# Patient Record
Sex: FEMALE | Race: AMERICAN INDIAN OR ALASKA NATIVE | HISPANIC OR LATINO | Employment: FULL TIME | URBAN - METROPOLITAN AREA
[De-identification: names, ages, dates, MRNs, and addresses within clinical notes are randomized per-mention and may not be internally consistent; named-entity substitution may affect disease eponyms.]

---

## 2019-08-12 ENCOUNTER — APPOINTMENT (OUTPATIENT)
Dept: PHYSICAL THERAPY | Facility: MEDICAL CENTER | Age: 39
End: 2019-08-12
Attending: PHYSICIAN ASSISTANT
Payer: COMMERCIAL

## 2023-04-19 ENCOUNTER — OFFICE VISIT (OUTPATIENT)
Dept: NEPHROLOGY | Facility: MEDICAL CENTER | Age: 43
End: 2023-04-19

## 2023-04-19 VITALS
WEIGHT: 219 LBS | OXYGEN SATURATION: 98 % | TEMPERATURE: 98.6 F | HEIGHT: 64 IN | DIASTOLIC BLOOD PRESSURE: 84 MMHG | SYSTOLIC BLOOD PRESSURE: 142 MMHG | HEART RATE: 71 BPM | BODY MASS INDEX: 37.39 KG/M2

## 2023-04-19 DIAGNOSIS — N18.31 STAGE 3A CHRONIC KIDNEY DISEASE: ICD-10-CM

## 2023-04-19 PROCEDURE — 99214 OFFICE O/P EST MOD 30 MIN: CPT | Performed by: INTERNAL MEDICINE

## 2023-04-19 RX ORDER — ATORVASTATIN CALCIUM 10 MG/1
10 TABLET, FILM COATED ORAL
COMMUNITY
Start: 2023-03-21 | End: 2024-02-29

## 2023-04-19 RX ORDER — LISINOPRIL 10 MG/1
1 TABLET ORAL DAILY
COMMUNITY
Start: 2023-03-20 | End: 2024-02-29

## 2023-04-19 RX ORDER — INSULIN GLARGINE 100 [IU]/ML
40 INJECTION, SOLUTION SUBCUTANEOUS EVERY EVENING
COMMUNITY
Start: 2023-03-21

## 2023-04-19 RX ORDER — SEMAGLUTIDE 1.34 MG/ML
1 INJECTION, SOLUTION SUBCUTANEOUS DAILY
COMMUNITY
Start: 2023-03-20 | End: 2024-03-18

## 2023-04-19 RX ORDER — INSULIN LISPRO 100 [IU]/ML
INJECTION, SOLUTION INTRAVENOUS; SUBCUTANEOUS
COMMUNITY
Start: 2023-03-21

## 2023-04-19 NOTE — PROGRESS NOTES
"NEPHROLOGY HISTORY AND PHYSICAL CLINIC        HPI:  Laine Amador is a 42 y.o. female who presents today to establish care.   Patient presents as a referal from [facility].   Cr 1.5 with miya ssociated eGFR 37 ml/min/1.73 m2.  Nephrology referral placed for further evaluation of suspected chronic kidney disease.    She has been in her normal state of health.    She does have longstanding insulin treated diabetes as well as complication with retinopathy at this time.  She was first diagnosed at the age of 20 years of age and has been on insulin for nearly 20 years.    Note she is on Ozempic for approximately 1 year.    She does report occasional nausea associated with insulin use however denies any vomiting or loose stools.  She denies chest pain and shortness of breath.  She denies urinary complaints at this time however does endorse seeing bubbles in her urine..  She does not take NSAIDs.    PMH:    Diabetes complicated by retinopathy in Insulin - diagnosed at age 20 years. Had been on metformin briefly and then started insulin around 2008  HTN  HLD    Social History  Smoked for 1 month around the age of 21 years.  Denies alcohol and illicit drug use   [employer]  Interest also in photojournalism  Has a 23 year old daughter    Family History - father suspected to have kidney disease  Daughter type 1     ROS    BP (!) 142/84 (BP Location: Right arm, Patient Position: Sitting, BP Cuff Size: Adult)   Pulse 71   Temp 37 °C (98.6 °F) (Temporal)   Ht 1.626 m (5' 4\")   Wt 99.3 kg (219 lb)   SpO2 98%   BMI 37.59 kg/m²     Physical Exam  GEN: alert and oriented. In no acute distress.   HEENT: moist oropharyngeal mucous membranes  CV:RRR  PULM: clear to auscultation bilaterally  ABD: soft non tender non distended  EXT: warm well perfused, no lower extremity edema.    Labs reviewed.  reviewed     Assessment:  Laine Amador is a 42 y.o. female who presents today to establish " care.  Patient with renal dysfunction likely chronic kidney disease.  Recent EGFR noted to be 37 L/min per 1.73 m²    Likely due to diabetic nephropathy. Patient with other microvascular changes mainly diabetic retinopathy.  Will obtain urinalysis and quantify protenuria.  Routine management of hypertension will also be essential in this patient.    - Routine management of hypertension and diabetes.  - Obtain repeat labs including repeat urinalysis.  -Obtain protein quantification.     - Continue lisinopril 10 gm for longterm nephroprotection as well as hypertension  -Dose all medications for patient eGFR  - avoid nephrotoxic agents including NSAIDs and contrast  -Encourage weight loss with caloric restriction and exercise.  -Defer renal ultrasound at this time unless urinary tract symptoms or with renal function decline  -Encourage low-sodium diet.  - Return to clinic in 4 months.        Simi Lopez MD  Nephrology  [facility]

## 2023-08-18 ENCOUNTER — APPOINTMENT (OUTPATIENT)
Dept: NEPHROLOGY | Facility: MEDICAL CENTER | Age: 43
End: 2023-08-18
Payer: COMMERCIAL

## 2023-08-23 ENCOUNTER — APPOINTMENT (OUTPATIENT)
Dept: NEPHROLOGY | Facility: MEDICAL CENTER | Age: 43
End: 2023-08-23
Payer: COMMERCIAL

## 2024-01-12 ENCOUNTER — TELEMEDICINE (OUTPATIENT)
Dept: NEPHROLOGY | Facility: MEDICAL CENTER | Age: 44
End: 2024-01-12
Payer: COMMERCIAL

## 2024-01-12 VITALS — HEIGHT: 64 IN | WEIGHT: 216 LBS | BODY MASS INDEX: 36.88 KG/M2

## 2024-01-12 DIAGNOSIS — N18.31 STAGE 3A CHRONIC KIDNEY DISEASE: ICD-10-CM

## 2024-01-12 PROCEDURE — 99214 OFFICE O/P EST MOD 30 MIN: CPT | Mod: 95 | Performed by: INTERNAL MEDICINE

## 2024-01-12 RX ORDER — SODIUM BICARBONATE 650 MG/1
650 TABLET ORAL 2 TIMES DAILY
Qty: 30 TABLET | Refills: 3 | Status: SHIPPED | OUTPATIENT
Start: 2024-01-12

## 2024-01-12 NOTE — PROGRESS NOTES
"NEPHROLOGY HISTORY AND PHYSICAL CONSULT NOTE    Chief Complaint   Patient presents with    Chronic Kidney Disease     This evaluation was conducted via Zoom using secure and encrypted videoconferencing technology. The patient was in a private location outside of their home in the [facility].    The patient's identity was confirmed and verbal consent was obtained for this virtual visit.       HPI:  Laine Amador is a 43 y.o. female with DM who presents for evaluation of ckd.     She has been having ocular injections for hemorrhage.     She remains on lisinopril 20 mg.      Most recent Cr 2.01 with an associated eGFR of 31 mL/min/1.73 m2.  HCOe 16. Hgb 13.4. Albumin to Cr 3.7g.     She was briefly on jardiance however did not tolerate it with nausea, vomitting. Therfore this was discontinued.    Patient remains on insulin regimen of lantus 50 units qAM and 50 units qHS, She takes humalog as needed with meals.     She does report occasional hypoglycemia  She is working with nutritionist.   No past medical history on file.    Outpatient Encounter Medications as of 1/12/2024   Medication Sig Dispense Refill    atorvastatin (LIPITOR) 10 MG Tab Take 10 mg by mouth.      LANTUS SOLOSTAR 100 UNIT/ML Solution Pen-injector injection       HUMALOG KWIKPEN 100 UNIT/ML Solution Pen-injector injection PEN       lisinopril (PRINIVIL) 10 MG Tab Take 1 Tablet by mouth every day.      Semaglutide, 1 MG/DOSE, (OZEMPIC, 1 MG/DOSE,) 4 MG/3ML Solution Pen-injector Inject  under the skin.       No facility-administered encounter medications on file as of 1/12/2024.        Allergies   Allergen Reactions    Metformin Nausea and Vomiting       ROS    Ht 1.626 m (5' 4\")   Wt 98 kg (216 lb)   BMI 37.08 kg/m²     Physical Exam  GEN: alert and oriented. In no acute distress.   HEENT: moist oropharyngeal mucous membranes  CV:RRR  PULM: clear to auscultation bilaterally  ABD: soft non tender non distended  EXT: warm well perfused, no lower " "extremity edema.    Labs reviewed.  No results for input(s): \"HGB\", \"ALBUMIN\", \"HDL\", \"TRIGLYCERIDE\", \"SODIUM\", \"POTASSIUM\", \"CHLORIDE\", \"CO2\", \"BUN\", \"CREATININE\", \"PHOSPHORUS\" in the last 8544 hours.    Invalid input(s): \"CHOLESTEROL\", \"LDL CLACULATED\", \"URIC ACID\", \"INTACT PTH\", \"PRO CREAT RATIO\"    No results found for: \"WBC\", \"RBC\", \"HEMOGLOBIN\", \"HEMATOCRIT\", \"MCV\", \"MCH\", \"MCHC\", \"MPV\"           URINALYSIS:  No results found for: \"COLORURINE\", \"CLARITY\", \"SPECGRAVITY\", \"PHURINE\", \"KETONES\", \"PROTEINURIN\", \"BILIRUBINUR\", \"UROBILU\", \"NITRITE\", \"LEUKESTERAS\", \"OCCULTBLOOD\"  UPC  No results found for: \"TOTPROTUR\" No results found for: \"CREATININEU\"    Imaging report(s) reviewed  No orders to display         Assessment:  Laine Amador is a 42 y.o. female who presents today to establish care.  Patient with renal dysfunction likely chronic kidney disease.  Recent EGFR noted to be 37 L/min per 1.73 m²     Likely due to diabetic nephropathy. Patient with other microvascular changes mainly diabetic retinopathy.  Will obtain urinalysis and quantify protenuria.  Routine management of hypertension will also be essential in this patient.     - Routine management of hypertension and diabetes.  - Obtain repeat labs including repeat urinalysis.  -Obtain protein quantification.     - Continue lisinopril 20 gm for longterm nephroprotection.   -Dose all medications for patient eGFR  - avoid nephrotoxic agents including NSAIDs and contrast  -Encourage weight loss with caloric restriction and exercise.  -Recommend renal ultrasound.   - Recommend renal biopsy  -Encourage low-sodium diet.  - Return to clinic in 4 months.         2. Diabetes Melitus- Continue lantus 50 qAM and 50 units of qHS. Continue to work with nutritionist. Diabetic diet.     3. Non Gap Metabolic Acidosis - moderately low. May be related to CKD. Sodium bicarbonate 650 po BID for now.     4. HTN - unclear what home blood pressures     5.Overweight- Ozempic. " Encourage weight loss with exercise and caloric  restriction.     Return to clinic in 1  months.     Simi Lopez MD  Nephrology  [facility]

## 2024-01-31 ENCOUNTER — HOSPITAL ENCOUNTER (OUTPATIENT)
Dept: RADIOLOGY | Facility: MEDICAL CENTER | Age: 44
End: 2024-01-31
Attending: INTERNAL MEDICINE
Payer: COMMERCIAL

## 2024-01-31 DIAGNOSIS — N18.31 STAGE 3A CHRONIC KIDNEY DISEASE: ICD-10-CM

## 2024-01-31 PROCEDURE — 76775 US EXAM ABDO BACK WALL LIM: CPT

## 2024-02-02 ENCOUNTER — APPOINTMENT (OUTPATIENT)
Dept: ADMISSIONS | Facility: MEDICAL CENTER | Age: 44
End: 2024-02-02
Attending: INTERNAL MEDICINE
Payer: COMMERCIAL

## 2024-02-16 ENCOUNTER — PATIENT MESSAGE (OUTPATIENT)
Dept: HEALTH INFORMATION MANAGEMENT | Facility: OTHER | Age: 44
End: 2024-02-16

## 2024-02-29 ENCOUNTER — APPOINTMENT (OUTPATIENT)
Dept: ADMISSIONS | Facility: MEDICAL CENTER | Age: 44
End: 2024-02-29
Payer: COMMERCIAL

## 2024-02-29 RX ORDER — INSULIN GLARGINE 100 [IU]/ML
30 INJECTION, SOLUTION SUBCUTANEOUS EVERY EVENING
COMMUNITY

## 2024-02-29 RX ORDER — LISINOPRIL 20 MG/1
20 TABLET ORAL DAILY
COMMUNITY

## 2024-02-29 RX ORDER — ATORVASTATIN CALCIUM 10 MG/1
10 TABLET, FILM COATED ORAL DAILY
COMMUNITY

## 2024-03-01 ENCOUNTER — APPOINTMENT (OUTPATIENT)
Dept: RADIOLOGY | Facility: MEDICAL CENTER | Age: 44
End: 2024-03-01
Attending: INTERNAL MEDICINE
Payer: COMMERCIAL

## 2024-03-01 ENCOUNTER — HOSPITAL ENCOUNTER (OUTPATIENT)
Facility: MEDICAL CENTER | Age: 44
End: 2024-03-01
Attending: INTERNAL MEDICINE | Admitting: INTERNAL MEDICINE
Payer: COMMERCIAL

## 2024-03-01 VITALS
WEIGHT: 216.27 LBS | HEIGHT: 64 IN | OXYGEN SATURATION: 99 % | SYSTOLIC BLOOD PRESSURE: 149 MMHG | DIASTOLIC BLOOD PRESSURE: 70 MMHG | RESPIRATION RATE: 18 BRPM | TEMPERATURE: 97 F | HEART RATE: 96 BPM | BODY MASS INDEX: 36.92 KG/M2

## 2024-03-01 DIAGNOSIS — N18.31 STAGE 3A CHRONIC KIDNEY DISEASE: ICD-10-CM

## 2024-03-01 LAB
ERYTHROCYTE [DISTWIDTH] IN BLOOD BY AUTOMATED COUNT: 40.3 FL (ref 35.9–50)
GLUCOSE BLD STRIP.AUTO-MCNC: 133 MG/DL (ref 65–99)
GLUCOSE BLD STRIP.AUTO-MCNC: 56 MG/DL (ref 65–99)
GLUCOSE BLD STRIP.AUTO-MCNC: 58 MG/DL (ref 65–99)
GLUCOSE BLD STRIP.AUTO-MCNC: 64 MG/DL (ref 65–99)
GLUCOSE BLD STRIP.AUTO-MCNC: 80 MG/DL (ref 65–99)
HCG UR QL: NEGATIVE
HCT VFR BLD AUTO: 42.6 % (ref 37–47)
HGB BLD-MCNC: 14.3 G/DL (ref 12–16)
INR PPP: 1 (ref 0.87–1.13)
MCH RBC QN AUTO: 28.7 PG (ref 27–33)
MCHC RBC AUTO-ENTMCNC: 33.6 G/DL (ref 32.2–35.5)
MCV RBC AUTO: 85.5 FL (ref 81.4–97.8)
PATHOLOGY CONSULT NOTE: NORMAL
PLATELET # BLD AUTO: 303 K/UL (ref 164–446)
PMV BLD AUTO: 10.8 FL (ref 9–12.9)
PROTHROMBIN TIME: 13.3 SEC (ref 12–14.6)
RBC # BLD AUTO: 4.98 M/UL (ref 4.2–5.4)
WBC # BLD AUTO: 10.8 K/UL (ref 4.8–10.8)

## 2024-03-01 PROCEDURE — 700105 HCHG RX REV CODE 258: Performed by: RADIOLOGY

## 2024-03-01 PROCEDURE — 85610 PROTHROMBIN TIME: CPT

## 2024-03-01 PROCEDURE — 81025 URINE PREGNANCY TEST: CPT

## 2024-03-01 PROCEDURE — 82962 GLUCOSE BLOOD TEST: CPT

## 2024-03-01 PROCEDURE — 160002 HCHG RECOVERY MINUTES (STAT)

## 2024-03-01 PROCEDURE — 77012 CT SCAN FOR NEEDLE BIOPSY: CPT

## 2024-03-01 PROCEDURE — 160035 HCHG PACU - 1ST 60 MINS PHASE I

## 2024-03-01 PROCEDURE — 88300 SURGICAL PATH GROSS: CPT

## 2024-03-01 PROCEDURE — 700111 HCHG RX REV CODE 636 W/ 250 OVERRIDE (IP): Mod: JZ | Performed by: STUDENT IN AN ORGANIZED HEALTH CARE EDUCATION/TRAINING PROGRAM

## 2024-03-01 PROCEDURE — 700111 HCHG RX REV CODE 636 W/ 250 OVERRIDE (IP): Mod: JZ

## 2024-03-01 PROCEDURE — 85027 COMPLETE CBC AUTOMATED: CPT

## 2024-03-01 RX ORDER — HYDRALAZINE HYDROCHLORIDE 20 MG/ML
INJECTION INTRAMUSCULAR; INTRAVENOUS
Status: COMPLETED
Start: 2024-03-01 | End: 2024-03-01

## 2024-03-01 RX ORDER — MIDAZOLAM HYDROCHLORIDE 1 MG/ML
INJECTION INTRAMUSCULAR; INTRAVENOUS
Status: COMPLETED
Start: 2024-03-01 | End: 2024-03-01

## 2024-03-01 RX ORDER — MIDAZOLAM HYDROCHLORIDE 1 MG/ML
.5-2 INJECTION INTRAMUSCULAR; INTRAVENOUS PRN
Status: DISCONTINUED | OUTPATIENT
Start: 2024-03-01 | End: 2024-03-01 | Stop reason: HOSPADM

## 2024-03-01 RX ORDER — HYDRALAZINE HYDROCHLORIDE 20 MG/ML
10 INJECTION INTRAMUSCULAR; INTRAVENOUS ONCE
Status: COMPLETED | OUTPATIENT
Start: 2024-03-01 | End: 2024-03-01

## 2024-03-01 RX ORDER — SODIUM CHLORIDE 9 MG/ML
1000 INJECTION, SOLUTION INTRAVENOUS
Status: COMPLETED | OUTPATIENT
Start: 2024-03-01 | End: 2024-03-01

## 2024-03-01 RX ORDER — ONDANSETRON 2 MG/ML
4 INJECTION INTRAMUSCULAR; INTRAVENOUS PRN
Status: DISCONTINUED | OUTPATIENT
Start: 2024-03-01 | End: 2024-03-01 | Stop reason: HOSPADM

## 2024-03-01 RX ORDER — SODIUM CHLORIDE 9 MG/ML
500 INJECTION, SOLUTION INTRAVENOUS
Status: DISCONTINUED | OUTPATIENT
Start: 2024-03-01 | End: 2024-03-01 | Stop reason: HOSPADM

## 2024-03-01 RX ADMIN — HYDRALAZINE HYDROCHLORIDE 10 MG: 20 INJECTION INTRAMUSCULAR; INTRAVENOUS at 14:10

## 2024-03-01 RX ADMIN — MIDAZOLAM HYDROCHLORIDE 2 MG: 1 INJECTION INTRAMUSCULAR; INTRAVENOUS at 14:27

## 2024-03-01 RX ADMIN — HYDRALAZINE HYDROCHLORIDE 10 MG: 20 INJECTION, SOLUTION INTRAMUSCULAR; INTRAVENOUS at 14:10

## 2024-03-01 RX ADMIN — SODIUM CHLORIDE 1000 ML: 9 INJECTION, SOLUTION INTRAVENOUS at 11:18

## 2024-03-01 RX ADMIN — MIDAZOLAM HYDROCHLORIDE 2 MG: 1 INJECTION, SOLUTION INTRAMUSCULAR; INTRAVENOUS at 14:27

## 2024-03-01 RX ADMIN — FENTANYL CITRATE 50 MCG: 50 INJECTION, SOLUTION INTRAMUSCULAR; INTRAVENOUS at 14:27

## 2024-03-01 RX ADMIN — FENTANYL CITRATE 50 MCG: 50 INJECTION, SOLUTION INTRAMUSCULAR; INTRAVENOUS at 14:31

## 2024-03-01 NOTE — DISCHARGE INSTRUCTIONS
HOME CARE INSTRUCTIONS    ACTIVITY: Rest and take it easy for the first 24 hours.  A responsible adult is recommended to remain with you during that time.  It is normal to feel sleepy.  We encourage you to not do anything that requires balance, judgment or coordination.    FOR 24 HOURS DO NOT:  Drive, operate machinery or run household appliances.  Drink beer or alcoholic beverages.  Make important decisions or sign legal documents.    SPECIAL INSTRUCTIONS:     Percutaneous Kidney Biopsy, Care After  This sheet gives you information about how to care for yourself after your procedure. Your health care provider may also give you more specific instructions. If you have problems or questions, contact your health care provider.  What can I expect after the procedure?  After the procedure, it is common to have:  Pain or soreness near the biopsy site.  Pink or cloudy urine for 24 hours after the procedure. This is normal.  Follow these instructions at home:  Activity  Return to your normal activities as told by your health care provider. Ask your health care provider what activities are safe for you.  If you were given a sedative during the procedure, it can affect you for several hours. Do not drive or operate machinery until your health care provider says that it is safe.  Do not lift anything that is heavier than 10 lb (4.5 kg), or the limit that you are told, until your health care provider says that it is safe.  Avoid activities that take a lot of effort until your health care provider approves. Most people will have to wait 2 weeks before returning to activities such as exercise or sex.  General instructions  Take over-the-counter and prescription medicines only as told by your health care provider.  Follow instructions from your health care provider about eating or drinking restrictions.  Check your biopsy site every day for signs of infection. Check for:  More redness, swelling, or pain.  Fluid or  blood.  Warmth.  Pus or a bad smell.  Keep all follow-up visits as told by your health care provider. This is important.  Contact a health care provider if:  You have more redness, swelling, or pain around your biopsy site.  You have fluid or blood coming from your biopsy site.  Your biopsy site feels warm to the touch.  You have pus or a bad smell coming from your biopsy site.  You have blood in your urine more than 24 hours after your procedure.  Get help right away if:  Your urine is dark red or brown.  You have a fever.  You are not able to urinate.  You feel burning when you urinate.  You feel dizzy or light-headed.  You have severe pain in your abdomen or side.  Summary  After the procedure, it is common to have pain or soreness at the biopsy site and pink or cloudy urine for the first 24 hours.  Check your biopsy site each day for signs of infection, such as more redness, swelling, or pain; fluid, blood, pus or a bad smell coming from the biopsy site; or the biopsy site feeling warm to the touch.  Return to your normal activities as told by your health care provider.  This information is not intended to replace advice given to you by your health care provider. Make sure you discuss any questions you have with your health care provider.    DIET: To avoid nausea, slowly advance diet as tolerated, avoiding spicy or greasy foods for the first day.  Add more substantial food to your diet according to your physician's instructions.  Babies can be fed formula or breast milk as soon as they are hungry.  INCREASE FLUIDS AND FIBER TO AVOID CONSTIPATION.    SURGICAL DRESSING/BATHING: keep dressing clean and dry for 24 hours. Okay to remove and shower after that. Do not submerge in water for one week.    MEDICATIONS: Resume taking daily medication.  Take prescribed pain medication with food.  If no medication is prescribed, you may take non-aspirin pain medication if needed.  PAIN MEDICATION CAN BE VERY CONSTIPATING.   Take a stool softener or laxative such as senokot, pericolace, or milk of magnesia if needed.    A follow-up appointment should be arranged with your doctor in 1-2 weeks; call to schedule.    You should CALL YOUR PHYSICIAN if you develop:  Fever greater than 101 degrees F.  Pain not relieved by medication, or persistent nausea or vomiting.  Excessive bleeding (blood soaking through dressing) or unexpected drainage from the wound.  Extreme redness or swelling around the incision site, drainage of pus or foul smelling drainage.  Inability to urinate or empty your bladder within 8 hours.  Problems with breathing or chest pain.    You should call 911 if you develop problems with breathing or chest pain.  If you are unable to contact your doctor or surgical center, you should go to the nearest emergency room or urgent care center.  Physician's telephone #: Dr. Lopez [phone].    MILD FLU-LIKE SYMPTOMS ARE NORMAL.  YOU MAY EXPERIENCE GENERALIZED MUSCLE ACHES, THROAT IRRITATION, HEADACHE AND/OR SOME NAUSEA.    If any questions arise, call your doctor.  If your doctor is not available, please feel free to call the Surgical Center at [phone].  The Center is open Monday through Friday from 7AM to 7PM.      A registered nurse may call you a few days after your surgery to see how you are doing after your procedure.    You may also receive a survey in the mail within the next two weeks and we ask that you take a few moments to complete the survey and return it to us.  Our goal is to provide you with very good care and we value your comments.     Depression / Suicide Risk    As you are discharged from this [facility] facility, it is important to learn how to keep safe from harming yourself.    Recognize the warning signs:  Abrupt changes in personality, positive or negative- including increase in energy   Giving away possessions  Change in eating patterns- significant weight changes-  positive or  negative  Change in sleeping patterns- unable to sleep or sleeping all the time   Unwillingness or inability to communicate  Depression  Unusual sadness, discouragement and loneliness  Talk of wanting to die  Neglect of personal appearance   Rebelliousness- reckless behavior  Withdrawal from people/activities they love  Confusion- inability to concentrate     If you or a loved one observes any of these behaviors or has concerns about self-harm, here's what you can do:  Talk about it- your feelings and reasons for harming yourself  Remove any means that you might use to hurt yourself (examples: pills, rope, extension cords, firearm)  Get professional help from the community (Mental Health, Substance Abuse, psychological counseling)  Do not be alone:Call your Safe Contact- someone whom you trust who will be there for you.  Call your local CRISIS HOTLINE 077-2057 or 350-680-4092  Call your local Children's Mobile Crisis Response Team [place] (443) 197-4624 or www.[employer]  Call the toll free National Suicide Prevention Hotlines   National Suicide Prevention Lifeline 567-342-CQTI (2647)  [place] [facility] 800-SUICIDE (344-5629)    I acknowledge receipt and understanding of these Home Care instructions.

## 2024-03-01 NOTE — H&P
History and Physical    Date: 3/1/2024    PCP: Leilani Turcios M.D.      CC: Renal dysfunction    HPI: This is a 43 y.o. female who is presenting with renal dysfunction    Past Medical History:   Diagnosis Date    Breath shortness     with exercise, feels like throat closes on me    Diabetes (HCC)     diet and insulin    Hypertension     Renal disorder     decreased kidney function - stage 3       History reviewed. No pertinent surgical history.    No current facility-administered medications for this encounter.        Social History     Socioeconomic History    Marital status: Single     Spouse name: Not on file    Number of children: Not on file    Years of education: Not on file    Highest education level: Not on file   Occupational History    Not on file   Tobacco Use    Smoking status: Former     Current packs/day: 0.00     Types: Cigarettes     Quit date: 2008     Years since quittin.1    Smokeless tobacco: Never   Vaping Use    Vaping Use: Never used   Substance and Sexual Activity    Alcohol use: Not Currently     Comment: rare, doesn't tolerate well    Drug use: Never    Sexual activity: Not on file   Other Topics Concern    Not on file   Social History Narrative    Not on file     Social Determinants of Health     Financial Resource Strain: Not on file   Food Insecurity: Not on file   Transportation Needs: Not on file   Physical Activity: Not on file   Stress: Not on file   Social Connections: Not on file   Intimate Partner Violence: Not on file   Housing Stability: Not on file       History reviewed. No pertinent family history.    Allergies:  Metformin    Review of Systems:  Negative except for none    Physical Exam  Pulmonary:      Effort: Pulmonary effort is normal.   Skin:     General: Skin is warm and dry.   Neurological:      Mental Status: She is alert.   Psychiatric:         Mood and Affect: Mood normal.         Behavior: Behavior normal.         Thought Content: Thought content  normal.         Judgment: Judgment normal.         Vital Signs  Blood Pressure: (!) 152/103   Temperature: 36.3 °C (97.3 °F)   Pulse: 92   Respiration: 20   Pulse Oximetry: 94 %        Labs:  Recent Labs     03/01/24  1105   WBC 10.8   RBC 4.98   HEMOGLOBIN 14.3   HEMATOCRIT 42.6   MCV 85.5   MCH 28.7   MCHC 33.6   RDW 40.3   PLATELETCT 303   MPV 10.8         Recent Labs     03/01/24  1105   INR 1.00     Recent Labs     03/01/24  1105   INR 1.00       Radiology:  No orders to display             Assessment and Plan:This is a 43 y.o. with renal dysfunction for biopsy

## 2024-03-01 NOTE — PROGRESS NOTES
Pt presents to CT 4. Pt. was consented by MD at bedside, confirmed by this RN and consent at bedside. Pt transferred to procedure table in supine position. Patient underwent a non-targeted renal biopsy by Dr. Nathan. Procedure site was marked by MD and verified using imaging guidance. Pt placed on monitor, prepped and draped in a sterile fashion. Vitals were taken every 5 minutes and remained stable during procedure (see doc flow sheet for results). CO2 waveform capnography was monitored and remained WNL throughout procedure. Report called to KIRAN Lehman. Pt transported by stretcher with RN to PPU 2.     Specimen: Taken by pathology from Dr. Nathan

## 2024-03-01 NOTE — OR SURGEON
Immediate Post- Operative Note        Findings: Left kidney      Procedure(s): Biopsy      Estimated Blood Loss: Less than 5 ml        Complications: None            3/1/2024     2:40 PM     Reji Nathan M.D.

## 2024-03-02 NOTE — OR NURSING
1445 Patient arrived to PACU from IR.  Report from anesthesia and RN.  Patient is awake and alert.  Dressing to left lower back is clean, dry and soft.  Patient updated on plan of care.  1505 Patient hypoglycemic, given juice and food.  1557 Blood sugar is 133, patient reports feeling better.  Access site is clean, dry and soft.  1630 Access site clean, dry and soft.  All lines and monitors discontinued. Reviewed discharge paperwork with pt. Discussed diet, activity, medications, follow up care and worsening symptoms. No questions at this time.   1640 Pt discharged home with  via wheelchair by RN.

## 2024-03-05 ENCOUNTER — TELEPHONE (OUTPATIENT)
Dept: NEPHROLOGY | Facility: MEDICAL CENTER | Age: 44
End: 2024-03-05
Payer: COMMERCIAL

## 2024-03-06 ENCOUNTER — APPOINTMENT (OUTPATIENT)
Dept: NEPHROLOGY | Facility: MEDICAL CENTER | Age: 44
End: 2024-03-06
Payer: COMMERCIAL

## 2024-03-06 NOTE — TELEPHONE ENCOUNTER
Pt was established w/ Dr. EMERY, she has completed biopsy and would like results to review, message was sent to Dr. EMERY a few days ago and there was no response! Please review biopsy and will schedule appoint w/ you   
What Type Of Note Output Would You Prefer (Optional)?: Bullet Format
Hpi Title: Evaluation of Skin Lesions
How Severe Are Your Spot(S)?: mild
Have Your Spot(S) Been Treated In The Past?: has not been treated
Additional History: Pt denies lesions of concern.

## 2024-05-08 ENCOUNTER — OFFICE VISIT (OUTPATIENT)
Dept: NEPHROLOGY | Facility: MEDICAL CENTER | Age: 44
End: 2024-05-08
Payer: COMMERCIAL

## 2024-05-08 VITALS
HEIGHT: 64 IN | WEIGHT: 217 LBS | HEART RATE: 95 BPM | DIASTOLIC BLOOD PRESSURE: 80 MMHG | OXYGEN SATURATION: 96 % | SYSTOLIC BLOOD PRESSURE: 148 MMHG | BODY MASS INDEX: 37.05 KG/M2 | TEMPERATURE: 97.7 F

## 2024-05-08 DIAGNOSIS — N25.81 HYPERPARATHYROIDISM DUE TO RENAL INSUFFICIENCY (HCC): ICD-10-CM

## 2024-05-08 DIAGNOSIS — R76.8 POSITIVE ANA (ANTINUCLEAR ANTIBODY): ICD-10-CM

## 2024-05-08 DIAGNOSIS — E55.9 VITAMIN D DEFICIENCY: ICD-10-CM

## 2024-05-08 DIAGNOSIS — N18.4 CKD (CHRONIC KIDNEY DISEASE), STAGE IV (HCC): ICD-10-CM

## 2024-05-08 DIAGNOSIS — E11.21 DIABETIC NEPHROPATHY ASSOCIATED WITH TYPE 2 DIABETES MELLITUS (HCC): ICD-10-CM

## 2024-05-08 DIAGNOSIS — I10 PRIMARY HYPERTENSION: ICD-10-CM

## 2024-05-08 DIAGNOSIS — D64.9 ANEMIA, UNSPECIFIED TYPE: ICD-10-CM

## 2024-05-08 PROCEDURE — 3079F DIAST BP 80-89 MM HG: CPT | Performed by: INTERNAL MEDICINE

## 2024-05-08 PROCEDURE — 3077F SYST BP >= 140 MM HG: CPT | Performed by: INTERNAL MEDICINE

## 2024-05-08 PROCEDURE — 99214 OFFICE O/P EST MOD 30 MIN: CPT | Performed by: INTERNAL MEDICINE

## 2024-05-08 RX ORDER — LISINOPRIL 20 MG/1
20 TABLET ORAL 2 TIMES DAILY
Qty: 180 TABLET | Refills: 2 | Status: SHIPPED | OUTPATIENT
Start: 2024-05-08

## 2024-05-08 RX ORDER — SEMAGLUTIDE 1.34 MG/ML
INJECTION, SOLUTION SUBCUTANEOUS
COMMUNITY

## 2024-05-08 ASSESSMENT — ENCOUNTER SYMPTOMS
HEMOPTYSIS: 0
SHORTNESS OF BREATH: 0
COUGH: 0
ABDOMINAL PAIN: 0
WHEEZING: 0
VOMITING: 0
NAUSEA: 0
FEVER: 0
SINUS PAIN: 0
WEIGHT LOSS: 0
ORTHOPNEA: 0
EYES NEGATIVE: 1
CHILLS: 0
FLANK PAIN: 0
CONSTIPATION: 0
DIARRHEA: 0
PALPITATIONS: 0

## 2024-05-08 NOTE — PATIENT INSTRUCTIONS
Continue current treatment  Lisinopril 20 mg BID  Baking soda 1 tea spoon with water daily  Monitor BP and call clinic if BP > 135/85 or < 110/60  Low salt diet  Keep well hydrated  Avoid NSAID's  Predialysis education  Rheumatology evaluation

## 2024-05-09 NOTE — PROGRESS NOTES
Subjective     Josephine Amador is a 43 y.o. female who presents with Chronic Kidney Disease            HPI  Josephine id coming today for f/u of CKD IV  S/p kidney biopsy -revealed diabetic nephropathy stage IV, IgA nephropathy with severe   Chronicity  Doing well, no complaints  No dysuria/hematuria/flank pain  Creat level worse to 2.7 -GFR 27  Anemia : Hb stable  Metabolic acidosis -added Na HCO3  Albuminuria: severe, at nephrotic range -increased Lisinopril dose to 20 mg po BID  Serology positive for ODELL 1: 1200 -Rheumatology referral  HTN: BP above the goal - increased Lisinopril dose    Review of Systems   Constitutional:  Negative for chills, fever, malaise/fatigue and weight loss.   HENT:  Negative for congestion, hearing loss and sinus pain.    Eyes: Negative.    Respiratory:  Negative for cough, hemoptysis, shortness of breath and wheezing.    Cardiovascular:  Negative for chest pain, palpitations, orthopnea and leg swelling.   Gastrointestinal:  Negative for abdominal pain, constipation, diarrhea, nausea and vomiting.   Genitourinary:  Negative for dysuria, flank pain, frequency, hematuria and urgency.   Skin: Negative.    All other systems reviewed and are negative.         Past Medical History:   Diagnosis Date    Breath shortness     with exercise, feels like throat closes on me    Diabetes (HCC)     diet and insulin    Hypertension     Renal disorder     decreased kidney function - stage 3       History reviewed. No pertinent family history.    Social History     Socioeconomic History    Marital status: Single   Tobacco Use    Smoking status: Former     Current packs/day: 0.00     Types: Cigarettes     Quit date: 2008     Years since quittin.3    Smokeless tobacco: Never   Vaping Use    Vaping Use: Never used   Substance and Sexual Activity    Alcohol use: Not Currently     Comment: rare, doesn't tolerate well    Drug use: Never         Objective     BP (!) 148/80 (BP Location: Right  "arm, Patient Position: Sitting, BP Cuff Size: Adult)   Pulse 95   Temp 36.5 °C (97.7 °F) (Temporal)   Ht 1.626 m (5' 4\")   Wt 98.4 kg (217 lb)   SpO2 96%   BMI 37.25 kg/m²      Physical Exam  Vitals reviewed.   Constitutional:       General: She is not in acute distress.     Appearance: Normal appearance. She is well-developed. She is obese. She is not diaphoretic.   HENT:      Head: Normocephalic and atraumatic.      Nose: Nose normal.      Mouth/Throat:      Mouth: Mucous membranes are moist.      Pharynx: Oropharynx is clear.   Eyes:      Extraocular Movements: Extraocular movements intact.      Conjunctiva/sclera: Conjunctivae normal.      Pupils: Pupils are equal, round, and reactive to light.   Cardiovascular:      Rate and Rhythm: Normal rate and regular rhythm.      Pulses: Normal pulses.      Heart sounds: Normal heart sounds.   Pulmonary:      Effort: Pulmonary effort is normal. No respiratory distress.      Breath sounds: Normal breath sounds. No wheezing or rales.   Abdominal:      General: Bowel sounds are normal. There is no distension.      Palpations: Abdomen is soft. There is no mass.      Tenderness: There is no abdominal tenderness. There is no right CVA tenderness or left CVA tenderness.   Musculoskeletal:      Cervical back: Normal range of motion and neck supple.      Right lower leg: No edema.      Left lower leg: No edema.   Skin:     General: Skin is warm.      Coloration: Skin is not pale.      Findings: No erythema or rash.   Neurological:      General: No focal deficit present.      Mental Status: She is alert and oriented to person, place, and time.      Cranial Nerves: No cranial nerve deficit.      Coordination: Coordination normal.   Psychiatric:         Mood and Affect: Mood normal.         Behavior: Behavior normal.         Thought Content: Thought content normal.         Judgment: Judgment normal.         Laboratory results reviewed : d/w Pt  Creat level 2.7   "       Assessment & Plan        1. CKD (chronic kidney disease), stage IV (HCC)      With worsening creat level to monitor closely      Avoid NSAID's      Keep well hydrated      Predialysis education-kidney smart class  - CBC WITHOUT DIFFERENTIAL; Future  - Basic Metabolic Panel; Future  - PTH INTACT (PTH ONLY); Future  - PHOSPHORUS; Future  - Referral to Rheumatology    2. Diabetic nephropathy associated with type 2 diabetes mellitus (HCC)      With nephrotic range albuminuria -to monitor      With BP above the goal to increase Lisinopril dose    3. Primary hypertension      BP above the goal to monitor at home      Increase lisinopril dose    4. Anemia, unspecified type      Hb stable -to monitor  - CBC WITHOUT DIFFERENTIAL; Future    5. Positive ODELL (antinuclear antibody)    - Referral to Rheumatology    6. Vitamin D deficiency      To monitor  - VITAMIN D 25-HYDROXY    7. Hyperparathyroidism due to renal insufficiency ([facility])      PTH level at normal range -to monitor  - PTH INTACT (PTH ONLY); Future  8. Metabolic acidosis -added baking soda   Recs:  Continue current treatment  Lisinopril 20 mg BID  Baking soda 1 tea spoon with water daily  Monitor BP and call clinic if BP > 135/85 or < 110/60  Low salt diet  Keep well hydrated  Avoid NSAID's  Predialysis education  Rheumatology evaluation  F/u in 2 months

## 2024-05-24 ASSESSMENT — RHEUMATOLOGY NEW PATIENT QUESTIONNAIRE
FROTHY URINE: Y
FAST HEARTBEATS: Y
RATE_1TO10: 0
CHIEF_COMPLAINT: REFERRED
SHORTNESS OF BREATH: Y
NAUSEA: Y
MARK ALL THE AREAS OF PAIN: 101727045

## 2024-05-28 ENCOUNTER — OFFICE VISIT (OUTPATIENT)
Dept: RHEUMATOLOGY | Facility: MEDICAL CENTER | Age: 44
End: 2024-05-28
Attending: STUDENT IN AN ORGANIZED HEALTH CARE EDUCATION/TRAINING PROGRAM
Payer: COMMERCIAL

## 2024-05-28 VITALS
HEIGHT: 63 IN | BODY MASS INDEX: 37.92 KG/M2 | OXYGEN SATURATION: 98 % | HEART RATE: 78 BPM | DIASTOLIC BLOOD PRESSURE: 74 MMHG | TEMPERATURE: 98.1 F | SYSTOLIC BLOOD PRESSURE: 134 MMHG | WEIGHT: 214 LBS

## 2024-05-28 DIAGNOSIS — N02.B9 IGA NEPHROPATHY: ICD-10-CM

## 2024-05-28 DIAGNOSIS — D84.821 IMMUNOCOMPROMISED STATE DUE TO DRUG THERAPY (HCC): ICD-10-CM

## 2024-05-28 DIAGNOSIS — Z79.899 IMMUNOCOMPROMISED STATE DUE TO DRUG THERAPY (HCC): ICD-10-CM

## 2024-05-28 DIAGNOSIS — R76.8 SEROLOGIC AUTOIMMUNITY: Primary | ICD-10-CM

## 2024-05-28 RX ORDER — AZATHIOPRINE 75 MG/1
1 TABLET ORAL DAILY
Qty: 30 TABLET | Refills: 5 | Status: SHIPPED | OUTPATIENT
Start: 2024-05-28

## 2024-05-28 NOTE — PROGRESS NOTES
[facility] RHEUMATOLOGY  [address], [address], [place] [address]  Phone: [phone] ? Fax: [phone]  [facility]/Health-Services/Rheumatology    NEW PATIENT VISIT NOTE      DATE OF SERVICE: 05/28/2024         Subjective     REFERRING PRACTITIONER:  Rochelle Murillo M.D.  [address]  [address]  [place]  [place] [address]    PATIENT IDENTIFICATION:  Josephine Amador  [address] [id number]    YOB: 1980    MEDICAL RECORD NUMBER: [id number]         CHIEF COMPLAINT:   Chief Complaint   Patient presents with    New Patient     Positive ODELL       HISTORY OF PRESENT ILLNESS:  Josephine Amador is a 43 y.o. female with pertinent history notable for T2DM x 24 years, HTN, HLD, and CKD IV, who presents for rheumatologic evaluation in the setting of positive ODELL.     Patient has had evidence of CKD since 2007 but establish care with nephrology in 2023 due to worsening renal function.  Renal biopsy on 3/5/2024 showed severe chronic injury consistent with a combination of diabetic nephropathy and IgA nephropathy.  She has no history of palpable purpura, arthritis, or abdominal pain.  Reports history of photosensitive rash that occurred on the arms (large welts) and lasted several weeks.  This improves with adequate application of moisturizers.  No morning stiffness, Raynaud's phenomenon, esophageal disease, malar rash, history of stroke, DVT/PE/multiple miscarriages, sicca symptoms, episodes of red painful photophobic eyes, nasal/genital/nonpainful oral ulcerations, nonscarring alopecia, chest pains, dyspnea on exertion, or history of psoriasis.      Reports other aspects of symptoms and medical history as noted on the questionnaire below or scanned under media tab.    Pertinent treatments: No regular use of analgesics  Pertinent positive labs: 3/2024; ODELL (1: 1280, nuclear, spleckled), Crt (2.70), UPC (5964), + 1 occult blood  Pertinent negative labs: 3/2024; HIV, hep a/B/C,  Pertinent  imaging/imaging:   3/5/2024 renal biopsy: Severe chronic injury consistent with a combination of diabetic nephropathy and IgA nephropathy    [facility] New Patient History Form    5/24/2024  8:59 AM PDT - Filed by Patient   Demographic Information   Marital Status: Single   Occupation: Professor at [employer]   Highest Level of Education: Masters   MAIN REASON FOR VISIT Referred   HISTORY OF PRESENT ILLNESS   Date of symptom onset: 2024   Preceding incident/ailment:    Describe/list your symptoms: Diabetic, high blood pressure, kidney disease   Exacerbating factors:    Alleviating factors:    Helpful medications: Insulin, high blood pressure, cholesterol   Ineffective medications: Omegas 3   Severity of pain (scale of 1-10): 0   Personal/emotional stressors: None   Robert All The Areas Of Pain    REVIEW OF SYMPTOMS    General   Fevers    Chills    Night sweats    Malaise    Fatigue    Unintentional weight loss    Musculoskeletal   Joint pain    Morning stiffness duration    Morning stiffness characteristic    Joint swelling    Joint instability    Tendon pain    Muscle pain    Body aches    Dermatologic   Hair loss with bald spots    Hair shedding    Skin thickening    Skin plaques    Sunlight-induced skin rash    Cold-induced color changes (white, purple, red on rewarming)    Neurologic/Psychiatric   Weakness    Spasms    Tingling    Burning    Numbness    Insomnia    Anxiety    Depression    Head/Eyes   Headaches    Temple pain    Dizziness    Dry eyes    Eye pain    Eye redness    Blurry vision    Vision loss    Ears/Nose   Ear pain    Ringing in ears    Vertigo    Hearing loss    Nasal ulcers    Nosebleeds    Sinus pain    Nasal congestion    Snoring    Mouth/Throat   Oral ulcers    Bleeding gums    Dry mouth    Cavities    Sore throat    Sticking in throat    Difficulty speaking    Neck/Lymphatics   Thyroid pain    Thyroid swelling    Lymph node swelling    Cardiac/Respiratory   Chest pain with breathing    Dry  cough    Cough with bloody phlegm    Shortness of breath Yes   Fast heartbeats Yes   Irregular heartbeats    Gastrointestinal   Nausea Yes   Vomiting    Difficulty swallowing    Heartburn    Abdominal pain    Bloody stool    Mucus stool    Genitourinary   Pelvic pain    Genital ulcers    Abnormal discharge    Burning urination    Frothy urine Yes   Blood in urine    MEDICAL/PERSONAL HISTORY DETAILS   Current Medical Problems:    Past/Resolved Medical Problems:    Surgeries/Procedures (include month/year):    Prescription/Over-The-Counter/Herbal Medications:    Medication/Food/Material Allergies (include reactions):    Immunizations (include month/year)    Alcohol/Tobacco/Recreational Drugs:    Family Medical History (father, mother, siblings only):      REVIEW OF SYSTEMS:  Except as noted in the history above, relevant review of systems with emphasis on autoimmune rheumatic diseases was otherwise negative.    ACTIVE PROBLEM LIST:  CKD stage IV  IgA nephropathy  Hypertension    PAST MEDICAL HISTORY:  Past Medical History:   Diagnosis Date    Breath shortness     with exercise, feels like throat closes on me    Diabetes (HCC)     diet and insulin    Hypertension     Renal disorder     decreased kidney function - stage 3       PAST SURGICAL HISTORY:  No past surgical history on file.    MEDICATIONS:  Current Outpatient Medications   Medication Sig    Azathioprine 75 MG Tab Take 1 Tablet by mouth every day.    Semaglutide, 1 MG/DOSE, (OZEMPIC, 1 MG/DOSE,) 2 MG/1.5ML Solution Pen-injector Inject  under the skin.    lisinopril (PRINIVIL) 20 MG Tab Take 1 Tablet by mouth 2 times a day.    atorvastatin (LIPITOR) 10 MG Tab Take 10 mg by mouth every day.    HUMALOG KWIKPEN 100 UNIT/ML Solution Pen-injector injection PEN Rarely takes, uses for sliding scale if needed.    insulin glargine (LANTUS) 100 UNIT/ML SC SOLN Inject 30 Units under the skin every evening. (Patient not taking: Reported on 5/28/2024)    LANTUS SOLOSTAR 100  "UNIT/ML Solution Pen-injector injection Inject 40 Units under the skin every evening. (Patient not taking: Reported on 2024)       ALLERGIES:   Allergies   Allergen Reactions    Metformin Nausea and Vomiting       IMMUNIZATIONS:   Immunization History   Administered Date(s) Administered    MODERNA SARS-COV-2 VACCINE (12+) 2021, 2021       SOCIAL HISTORY:   Social History     Socioeconomic History    Marital status: Single   Tobacco Use    Smoking status: Former     Current packs/day: 0.00     Types: Cigarettes     Quit date: 2008     Years since quittin.4    Smokeless tobacco: Never   Vaping Use    Vaping status: Never Used   Substance and Sexual Activity    Alcohol use: Not Currently     Comment: rare, doesn't tolerate well    Drug use: Never     Social Determinants of Health     Intimate Partner Violence: Low Risk  (2023)    Received from [facility]    History of Abuse     History of Abuse: Denies       FAMILY HISTORY:  No family history on file.         Objective     Vital Signs: /74 (BP Location: Left arm, Patient Position: Sitting, BP Cuff Size: Adult)   Pulse 78   Temp 36.7 °C (98.1 °F) (Temporal)   Ht 1.6 m (5' 3\")   Wt 97.1 kg (214 lb)   SpO2 98% Body mass index is 37.91 kg/m².    General: Appears well and comfortable  Eyes: No scleral or conjunctival lesions  ENT: No apparent oral or nasal lesions  Head/Neck: No apparent scalp or neck lesions  Cardiovascular: Regular rate and rhythm; no pericardial rubs  Respiratory: Breathing quiet and unlabored; no rales or pleural rubs  Gastrointestinal: No apparent organomegaly or abdominal masses  Integumentary:   No palpable purpura   No malar rash  No psoriasis  Musculoskeletal: No significant joint tenderness, periarticular soft tissue swelling, warmth, erythema, or overt synovitis; no significant restriction in range of motion of joints examined  Neurologic: No focal sensory or motor deficits  Psychiatric: " Mood and affect appropriate    LABORATORY RESULTS REVIEWED AND INTERPRETED BY ME:  Lab Results   Component Value Date/Time    PROTHROMBTM 13.3 03/01/2024 11:05 AM    INR 1.00 03/01/2024 11:05 AM     Lab Results   Component Value Date/Time    WBC 10.8 03/01/2024 11:05 AM    RBC 4.98 03/01/2024 11:05 AM    HEMOGLOBIN 14.3 03/01/2024 11:05 AM    HEMATOCRIT 42.6 03/01/2024 11:05 AM    MCV 85.5 03/01/2024 11:05 AM    MCH 28.7 03/01/2024 11:05 AM    MCHC 33.6 03/01/2024 11:05 AM    RDW 40.3 03/01/2024 11:05 AM    PLATELETCT 303 03/01/2024 11:05 AM    MPV 10.8 03/01/2024 11:05 AM       RADIOLOGY RESULTS REVIEWED AND INTERPRETED BY ME: See above    All relevant laboratory and imaging results reported on this note were reviewed and interpreted by me.         Assessment & Plan     Josephine Amador is a 43 y.o. female with history as noted above whose presentation merits the following clinical impressions and recommendations:    1. Serologic autoimmunity  ODELL (1: 1280, nuclear, spleckled), no reflex  Serologic evidence of immunologic activity without meeting the diagnostic criteria for any underlying ODELL-associated autoimmune disease, such as Sjogren syndrome, systemic lupus, inflammatory myopathy, or systemic sclerosis. Notably, the ODELL test is highly sensitive and lacks diagnostic specificity as it can be seen in a variety of non-rheumatic conditions, such as acute or chronic bacterial or viral infections (presumably via molecular mimicry), autoimmune thyroid disease (Hashimoto's thyroiditis or Graves' disease), autoimmune hepatobiliary disease, inflammatory bowel disease, and lymphoproliferative disease or malignancy, as well as in over 10% of healthy individuals with no clinical evidence of autoimmune rheumatic disease. In any case, it must be interpreted in the context of the overall clinical picture with close attention to disease-specific manifestations and disease-specific antibody subtypes. That said, the  presence of persistently positive ODELL, especially in high or rising titers, does confer some risk of ODELL-associated disease, so if truly inflammatory symptoms develop and persist, clinical follow-up for re-evaluation would be reasonable. In this case, reasonable to undertake the additional workup noted below for confirmatory, exclusionary, and risk stratification purposes.  - ANTINUCLEAR AB (ODELL), HEP-2, IGG W/RFLX; Future  - ANTI-SMOOTH MUSCLE ABS; Future  - ANTITHYROGLOBULIN AB; Future  - THYROID PEROXIDASE  (TPO) AB; Future  - MITOCHONDRIAL (M2) AB; Future  - LIVER-KIDNEY MICROSOMAL AB; Future  - COMPLEMENT C3; Future  - COMPLEMENT C4; Future    2. IgA nephropathy  Diagnosed on renal biopsy in 3/2024 with results showing severe chronic injury consistent with a combination of diabetic nephropathy and IgA nephropathy.  Overall clinical symptoms is consistent with renal limited IgA nephropathy as she does not have systemic symptoms (no abdominal pain, arthritis, or cutaneous manifestations).  IgA nephropathy is generally self-limited but can often require immunosuppressive therapy in patients with poor prognostic factors such as those with proteinuria > 1 g/day and nephrotic syndrome.  As such, will initiate immunosuppressive therapy with azathioprine and see if there is any improvement in her renal function. Risks, benefits and side effects discussed.   - ANCA-ASSOCIATED VASCULITIS PROFILE (ANCA/MPO/PR3); Future  - THIOPURINE METHYLTRANSFERASE, RBC; Future  - Azathioprine 75 MG Tab; Take 1 Tablet by mouth every day.  Dispense: 30 Tablet; Refill: 5  - PROTEIN/CREAT RATIO URINE; Future  - URINALYSIS; Future  - Continue lisinopril  - Patient did quite a few labs with PCP today and will send over the results    3. Immunocompromised state due to drug therapy (HCC)  Anticipate long-term use of azathioprine. Need routine monitoring per guidelines (every 3 months).    The above assessment and plan were discussed with the  patient who acknowledged understanding of the plan.    FOLLOW-UP: Return in about 3 months (around 8/28/2024).         Thank you for the opportunity to participate in the care of Josephine Amador.    Xiomara Felton D.O.  Rheumatologist

## 2024-05-28 NOTE — PATIENT INSTRUCTIONS
Thank you for visiting our clinic today.     Summary of your visit:      Follow up in 3 months.       AFTER VISIT INSTRUCTIONS    Below are important information to help you navigate your healthcare needs and help us serve you safely and effectively:  If laboratory tests and/or imaging studies were ordered, remember to go get them done as instructed.  If new prescriptions and/or refills were sent, remember to go pick them up from your local pharmacy, or call the specialty pharmacy to request shipment.  Always take your prescription medications exactly as prescribed unless instructed otherwise.  Note that antirheumatic drugs and steroids are immunosuppressive which means they increase your risk of infections and have multiple potential adverse effects on various organ systems in your body, though most of them are uncommon.  It is important that you are up-to-date on age-appropriate immunizations, particularly shingles and bacterial/viral pneumonia vaccines, which you can request from me or your primary care provider.  Be sure to read the drug package inserts to learn about the potential side effects of your medications before you start taking them.  If you experience any significant drug side effects, stop taking the medication and notify me promptly, and depending on the severity of the side effects, consider going to an urgent care or emergency department for immediate attention.  If there are significant findings on your lab tests and imaging studies that warrant further action, I will notify you with explanations via [employer] or phone call, otherwise you can view them on [employer] and let me know if you have any questions.  Note that [employer] messages are typically read during office hours and may take 1-7 business days before a response depending on the urgency of the situation and how busy my clinic schedule is.  In general, [employer] messaging is for non-urgent matters that do not require immediate attention, so for  urgent matters that cannot wait, you are advised to go to an urgent care.  Lastly, you are granted [employer] access to my documentation of your visit and are encouraged to read my note which details my assessment and plan for your condition.  To learn more about your condition and rheumatic diseases evaluated and treated by rheumatologists, as well as gain access to many helpful resources about these diseases, visit our website at [email or web address].      Azathioprine (Imuran) is a drug used in certain autoimmune conditions such as dermatomyositis, systemic lupus erythematosus (lupus), inflammatory bowel disease, vasculitis (Inflammation of the blood vessels), rheumatoid arthritis, as well as other inflammatory conditions. It suppresses the immune system by interfering with the creation of DNA molecules. It can be used in combination with other medications to suppress the immune system.    How to Take It  Azathioprine is taken by mouth (in doses between  mg), once or divided twice daily. A benefit in arthritis or other conditions may appear as early as 6-8 weeks. It may take up to 12 weeks to notice a full effect.    Side Effects  The most common side effects of azathioprine can involve the stomach, intestines, liver, pancreas, and the blood cells. Taking the medication twice daily instead of all at once, or taking it after eating, may help avoid these problems. Less often, azathioprine may cause damage to the liver, pancreas, or an allergic reaction that may include a flu-like illness or a rash. Azathioprine also can lower the number of infection-fighting white blood cells.  Before or during treatment, your doctor may perform a blood test called TPMT activity level. TPMT helps clear the medication from your system. If you have lower amounts of TPMT, you may be at higher risk for medication toxicity. It is important to take azathioprine as directed and have regular blood tests.    Tell  Your Rheumatology Provider  You should notify your rheumatology provider if you have these symptoms while taking this medication: fever, rash, easy bruising or bleeding, or signs of an infection. If vomiting occurs, you should contact your rheumatology provider, as this may be a sign of a serious reaction.  Be sure to tell your rheumatology provider about all of the medications you are taking, which may include over-the-counter drugs and natural remedies. Medications that may interfere with azathioprine and potentially cause serious problems include the gout medication allopurinol (Aloprim, Zyloprim); warfarin (Coumadin); some blood pressure medications, including some angiotensin-converting enzyme (ACE) inhibitors (Accupril or Vasotec); olsalazine (Dipentum); mesalamine (Asacol, Pentasa); and sulfasalazine (Azulfidine).  Make sure to notify your other health care providers while you are taking this drug. If you are pregnant or considering pregnancy, let your rheumatology provider know before starting this medication. Azathioprine can still be used during pregnancy, if needed. Women should still discuss birth control with their primary care providers or gynecologists. Breast-feeding can still take place while taking azathioprine, as there is a low transfer rate into breast milk.

## 2024-07-12 DIAGNOSIS — R76.8 SEROLOGIC AUTOIMMUNITY: ICD-10-CM

## 2024-07-12 RX ORDER — AZATHIOPRINE 75 MG/1
1 TABLET ORAL DAILY
Qty: 30 TABLET | Refills: 5 | Status: SHIPPED | OUTPATIENT
Start: 2024-07-12

## 2024-07-18 ENCOUNTER — TELEPHONE (OUTPATIENT)
Dept: RHEUMATOLOGY | Facility: MEDICAL CENTER | Age: 44
End: 2024-07-18
Payer: COMMERCIAL

## 2024-07-18 DIAGNOSIS — R76.8 SEROLOGIC AUTOIMMUNITY: ICD-10-CM

## 2024-07-18 RX ORDER — AZATHIOPRINE 75 MG/1
1 TABLET ORAL DAILY
Qty: 30 TABLET | Refills: 5 | Status: CANCELLED | OUTPATIENT
Start: 2024-07-18

## 2024-07-19 ENCOUNTER — HOSPITAL ENCOUNTER (OUTPATIENT)
Dept: LAB | Facility: MEDICAL CENTER | Age: 44
End: 2024-07-19
Attending: STUDENT IN AN ORGANIZED HEALTH CARE EDUCATION/TRAINING PROGRAM
Payer: COMMERCIAL

## 2024-07-19 DIAGNOSIS — N02.B9 IGA NEPHROPATHY: ICD-10-CM

## 2024-07-19 DIAGNOSIS — R76.8 SEROLOGIC AUTOIMMUNITY: ICD-10-CM

## 2024-07-19 LAB
APPEARANCE UR: CLEAR
BACTERIA #/AREA URNS HPF: NEGATIVE /HPF
BILIRUB UR QL STRIP.AUTO: NEGATIVE
C3 SERPL-MCNC: 123.3 MG/DL (ref 87–200)
C4 SERPL-MCNC: 26.2 MG/DL (ref 19–52)
COLOR UR: YELLOW
CREAT UR-MCNC: 100.04 MG/DL
EPI CELLS #/AREA URNS HPF: ABNORMAL /HPF
GLUCOSE UR STRIP.AUTO-MCNC: >=1000 MG/DL
HYALINE CASTS #/AREA URNS LPF: ABNORMAL /LPF
KETONES UR STRIP.AUTO-MCNC: NEGATIVE MG/DL
LEUKOCYTE ESTERASE UR QL STRIP.AUTO: NEGATIVE
MICRO URNS: ABNORMAL
NITRITE UR QL STRIP.AUTO: NEGATIVE
PH UR STRIP.AUTO: 6.5 [PH] (ref 5–8)
PROT UR QL STRIP: >=1000 MG/DL
PROT UR-MCNC: >600 MG/DL (ref 0–15)
PROT/CREAT UR: ABNORMAL MG/G (ref 10–107)
RBC # URNS HPF: ABNORMAL /HPF
RBC UR QL AUTO: ABNORMAL
SP GR UR STRIP.AUTO: 1.02
THYROPEROXIDASE AB SERPL-ACNC: <9 IU/ML (ref 0–9)
UROBILINOGEN UR STRIP.AUTO-MCNC: 0.2 MG/DL
WBC #/AREA URNS HPF: ABNORMAL /HPF

## 2024-07-19 PROCEDURE — 84433 ASY THIOPURIN S-MTHYLTRNSFRS: CPT

## 2024-07-19 PROCEDURE — 86381 MITOCHONDRIAL ANTIBODY EACH: CPT

## 2024-07-19 PROCEDURE — 86039 ANTINUCLEAR ANTIBODIES (ANA): CPT

## 2024-07-19 PROCEDURE — 82570 ASSAY OF URINE CREATININE: CPT

## 2024-07-19 PROCEDURE — 86376 MICROSOMAL ANTIBODY EACH: CPT

## 2024-07-19 PROCEDURE — 86015 ACTIN ANTIBODY EACH: CPT

## 2024-07-19 PROCEDURE — 86036 ANCA SCREEN EACH ANTIBODY: CPT

## 2024-07-19 PROCEDURE — 83516 IMMUNOASSAY NONANTIBODY: CPT

## 2024-07-19 PROCEDURE — 81001 URINALYSIS AUTO W/SCOPE: CPT

## 2024-07-19 PROCEDURE — 36415 COLL VENOUS BLD VENIPUNCTURE: CPT

## 2024-07-19 PROCEDURE — 86800 THYROGLOBULIN ANTIBODY: CPT

## 2024-07-19 PROCEDURE — 86160 COMPLEMENT ANTIGEN: CPT | Mod: 91

## 2024-07-19 PROCEDURE — 84156 ASSAY OF PROTEIN URINE: CPT

## 2024-07-22 DIAGNOSIS — R76.8 SEROLOGIC AUTOIMMUNITY: ICD-10-CM

## 2024-07-22 RX ORDER — AZATHIOPRINE 75 MG/1
1 TABLET ORAL DAILY
Qty: 30 TABLET | Refills: 5 | Status: CANCELLED | OUTPATIENT
Start: 2024-07-22

## 2024-07-24 LAB
ANA PAT SER IF-IMP: ABNORMAL
ANCA IFA PATTERN Q6048: NORMAL
ANCA IFA TITER Q6047: NORMAL
LKM AB TITR SER IF: NORMAL {TITER}
MITOCHONDRIA M2 IGG SER-ACNC: 2.6 UNITS (ref 0–24.9)
MYELOPEROXIDASE AB SER-ACNC: 0 AU/ML (ref 0–19)
NUCLEAR IGG SER QL IF: DETECTED
PROTEINASE3 AB SER-ACNC: 0 AU/ML (ref 0–19)
SMA IGG SER-ACNC: 10 UNITS (ref 0–19)
THYROGLOB AB SERPL-ACNC: <0.9 IU/ML (ref 0–4)
TPMT BLD-CCNC: 25.1 U/ML (ref 24–44)

## 2024-08-08 DIAGNOSIS — R76.8 SEROLOGIC AUTOIMMUNITY: ICD-10-CM

## 2024-08-08 RX ORDER — AZATHIOPRINE 75 MG/1
1 TABLET ORAL DAILY
Qty: 30 TABLET | Refills: 5 | Status: SHIPPED | OUTPATIENT
Start: 2024-08-08 | End: 2024-08-26 | Stop reason: SDUPTHER

## 2024-08-23 LAB
ANA PAT SER IF-IMP: ABNORMAL
CHROMATIN IGG SERPL-ACNC: 3 UNITS (ref 0–19)
DSDNA AB TITR SER CLIF: NORMAL {TITER}
ENA SCL70 IGG SER QL: 5 AU/ML (ref 0–40)
ENA SM IGG SER-ACNC: 54 AU/ML (ref 0–40)
ENA SS-A 60KD AB SER-ACNC: 0 AU/ML (ref 0–40)
ENA SS-A IGG SER QL: 1 AU/ML (ref 0–40)
ENA SS-B IGG SER IA-ACNC: 0 AU/ML (ref 0–40)
NUCLEAR IGG TITR SER IF: ABNORMAL {TITER}
U1 SNRNP IGG SER QL: 3 UNITS (ref 0–19)

## 2024-08-26 ENCOUNTER — OFFICE VISIT (OUTPATIENT)
Dept: RHEUMATOLOGY | Facility: MEDICAL CENTER | Age: 44
End: 2024-08-26
Attending: STUDENT IN AN ORGANIZED HEALTH CARE EDUCATION/TRAINING PROGRAM
Payer: COMMERCIAL

## 2024-08-26 VITALS
HEART RATE: 88 BPM | HEIGHT: 64 IN | OXYGEN SATURATION: 99 % | DIASTOLIC BLOOD PRESSURE: 78 MMHG | SYSTOLIC BLOOD PRESSURE: 148 MMHG | BODY MASS INDEX: 35.17 KG/M2 | TEMPERATURE: 97.6 F | WEIGHT: 206 LBS

## 2024-08-26 DIAGNOSIS — Z79.624 ENCOUNTER FOR LONG TERM CURRENT USE OF AZATHIOPRINE: ICD-10-CM

## 2024-08-26 DIAGNOSIS — R76.8 SEROLOGIC AUTOIMMUNITY: ICD-10-CM

## 2024-08-26 DIAGNOSIS — R68.89 THROAT CONGESTION: ICD-10-CM

## 2024-08-26 DIAGNOSIS — N02.B9 IGA NEPHROPATHY: ICD-10-CM

## 2024-08-26 DIAGNOSIS — N18.32 STAGE 3B CHRONIC KIDNEY DISEASE: ICD-10-CM

## 2024-08-26 PROCEDURE — 3077F SYST BP >= 140 MM HG: CPT | Performed by: STUDENT IN AN ORGANIZED HEALTH CARE EDUCATION/TRAINING PROGRAM

## 2024-08-26 PROCEDURE — 99214 OFFICE O/P EST MOD 30 MIN: CPT | Performed by: STUDENT IN AN ORGANIZED HEALTH CARE EDUCATION/TRAINING PROGRAM

## 2024-08-26 PROCEDURE — 99212 OFFICE O/P EST SF 10 MIN: CPT | Performed by: STUDENT IN AN ORGANIZED HEALTH CARE EDUCATION/TRAINING PROGRAM

## 2024-08-26 PROCEDURE — 3078F DIAST BP <80 MM HG: CPT | Performed by: STUDENT IN AN ORGANIZED HEALTH CARE EDUCATION/TRAINING PROGRAM

## 2024-08-26 RX ORDER — LOSARTAN POTASSIUM 100 MG/1
TABLET ORAL
COMMUNITY
Start: 2024-07-22

## 2024-08-26 RX ORDER — AZATHIOPRINE 75 MG/1
1 TABLET ORAL DAILY
Qty: 30 TABLET | Refills: 5 | Status: SHIPPED | OUTPATIENT
Start: 2024-08-26

## 2024-08-26 ASSESSMENT — PATIENT HEALTH QUESTIONNAIRE - PHQ9: CLINICAL INTERPRETATION OF PHQ2 SCORE: 0

## 2024-08-26 NOTE — PROGRESS NOTES
[facility] RHEUMATOLOGY  [address], [address], [place] [address]  Phone: [phone] ? Fax: [phone]  [facility]/Health-Services/Rheumatology    FOLLOW-UP VISIT NOTE      DATE OF SERVICE: 08/26/2024         Subjective     PRIMARY CARE PRACTITIONER:  Leilani Turcios M.D.  [address] [facility] [address]    PATIENT IDENTIFICATION:  Josephine Amador  [address] [address]    YOB: 1980    MEDICAL RECORD NUMBER: [id number]          CHIEF COMPLAINT:   Chief Complaint   Patient presents with    Follow-Up     Serologic autoimmunity       RHEUMATOLOGIC HISTORY:  Josephine Amador is a 44 y.o. female with pertinent history notable for T2DM x 24 years, HTN, HLD, and CKD III3b, who presents for follow up.      Initial consult 5/2024:  CKD since 2007 but established care with nephrology in 2023 due to worsening renal function.  Renal biopsy on 3/5/2024 showed severe chronic injury consistent with a combination of diabetic nephropathy and IgA nephropathy.  No history of palpable purpura, arthritis, or abdominal pain.  Reported hx of photosensitive rash that occurred on the arms (large welts) and lasted several weeks, improved with adequate application of moisturizers.  No AM stiffness, RP, esophageal disease, malar rash,  stroke, DVT/PE/multiple miscarriages, sicca symptoms, episodes of red painful photophobic eyes, nasal/genital/nonpainful oral ulcerations, nonscarring alopecia, chest pains, dyspnea on exertion, or hx of psoriasis.  She is a  at [employer]     Pertinent treatments:   Azathioprine 75 mg po: 5/2024- present  Pertinent positive labs: 7/2024; ODELL (1: 2560, speckled pattern), anti-SM (mildly positive at 54), UA >1000 protein, UPC probably 6K, 3/2024; ODELL (1: 1280, nuclear, spleckled), Crt (2.70), UPC (5964), + 1 occult blood  Pertinent negative labs: 7/2024; anti-dsDNA, anti-SM/RNP, anti-SCL 70, anti-SSA/SSB, antichromatin, anti-MPO/anti-CA-3, ANCA,  anti-LKM, antimitochondrial, anti-TPO, anti-TG, anti-smooth muscle, C3/C4, TPMT, 3/2024; HIV, hep A/B/C,  Pertinent imaging/imaging:   3/5/2024 renal biopsy: Severe chronic injury consistent with a combination of diabetic nephropathy and IgA nephropathy    INTERVAL HISTORY:  Reports interval history as noted on the questionnaire below or scanned under media tab.    She only took azathioprine for 1 month and did not refill the medication due to cost.  Requests that the medication be sent to [facility] pharmacy to see if cost is lower there.  She will establish with a new nephrologist in October (unsure the name). She was sick last week while in [place] and still has residual congestion.  She has a strange sensation of heightened gag reflex, and feeling of large amounts of mucus in the throat.  She was referred to pulmonology and was prescribed inhalers which were not helpful.  She also delayed gastric emptying study which showed mild delayed gastric emptying.  Her new primary is Ashely Solano NP in [place].  Continues to deny malar rash, mucosal ulcerations, nonscarring alopecia, pleurisy, joint pains and swelling, or morning stiffness.    REVIEW OF SYSTEMS:  Except as noted in the history above, relevant review of systems with emphasis on autoimmune rheumatic diseases was otherwise negative.    ACTIVE PROBLEM LIST:  CKD stage IIIb  T2DM  IgA nephropathy  Hypertension    PAST MEDICAL HISTORY:  Past Medical History:   Diagnosis Date    Breath shortness     with exercise, feels like throat closes on me    Diabetes (HCC)     diet and insulin    Hypertension     Renal disorder     decreased kidney function - stage 3       PAST SURGICAL HISTORY:  No past surgical history on file.    MEDICATIONS:  Current Outpatient Medications   Medication Sig    losartan (COZAAR) 100 MG Tab     Azathioprine 75 MG Tab Take 1 Tablet by mouth every day.    Semaglutide, 1 MG/DOSE, (OZEMPIC, 1 MG/DOSE,) 2 MG/1.5ML Solution Pen-injector  "Inject  under the skin.    HUMALOG KWIKPEN 100 UNIT/ML Solution Pen-injector injection PEN Rarely takes, uses for sliding scale if needed.    lisinopril (PRINIVIL) 20 MG Tab Take 1 Tablet by mouth 2 times a day. (Patient not taking: Reported on 2024)    insulin glargine (LANTUS) 100 UNIT/ML SC SOLN Inject 30 Units under the skin every evening. (Patient not taking: Reported on 2024)    atorvastatin (LIPITOR) 10 MG Tab Take 10 mg by mouth every day. (Patient not taking: Reported on 2024)    LANTUS SOLOSTAR 100 UNIT/ML Solution Pen-injector injection Inject 40 Units under the skin every evening. (Patient not taking: Reported on 2024)       ALLERGIES:   Allergies   Allergen Reactions    Metformin Nausea and Vomiting       IMMUNIZATIONS:   Immunization History   Administered Date(s) Administered    MODERNA SARS-COV-2 VACCINE (12+) 2021, 2021       SOCIAL HISTORY:   Social History     Socioeconomic History    Marital status: Single   Tobacco Use    Smoking status: Former     Current packs/day: 0.00     Types: Cigarettes     Quit date: 2008     Years since quittin.6    Smokeless tobacco: Never   Vaping Use    Vaping status: Never Used   Substance and Sexual Activity    Alcohol use: Not Currently     Comment: rare, doesn't tolerate well    Drug use: Never     Social Determinants of Health     Intimate Partner Violence: Low Risk  (2023)    Received from [facility]    History of Abuse     History of Abuse: Denies       FAMILY HISTORY:  No family history on file.         Objective     Vital Signs: BP (!) 148/78 (BP Location: Left arm, Patient Position: Sitting, BP Cuff Size: Adult)   Pulse 88   Temp 36.4 °C (97.6 °F) (Temporal)   Ht 1.626 m (5' 4\")   Wt 93.4 kg (206 lb)   SpO2 99% Body mass index is 35.36 kg/m².    General: Appears well and comfortable  Eyes: No scleral or conjunctival lesions  Cardiovascular: Regular rate and rhythm  Respiratory: Breathing " quiet and unlabored  Integumentary: No significant cutaneous lesions or dyspigmentation  Musculoskeletal: No significant swelling, tenderness, warmth or limitations of motion at joints examined  Neurologic: No focal sensory or motor deficits  Psychiatric: Mood and affect appropriate    LABORATORY RESULTS REVIEWED AND INTERPRETED BY ME:  Lab Results   Component Value Date/Time    N5ILAFDDLLZ 123.3 07/19/2024 11:26 AM    Z9TEOQXQYQC 26.2 07/19/2024 11:26 AM     Lab Results   Component Value Date/Time    ANTIDNADS SEE BELOW 07/19/2024 11:26 AM    SMITHAB 54 (H) 07/19/2024 11:26 AM    RNPAB 3 07/19/2024 11:26 AM    EAOWTMO02 5 07/19/2024 11:26 AM    SSA60 0 07/19/2024 11:26 AM    SSA52 1 07/19/2024 11:26 AM    ANTISSBSJ 0 07/19/2024 11:26 AM     Lab Results   Component Value Date/Time    ANTIMITOCHO 2.6 07/19/2024 11:26 AM    FACTIN 10 07/19/2024 11:26 AM     Lab Results   Component Value Date/Time    MICROSOMALA <9.0 07/19/2024 11:26 AM    ANTITHYROGL <0.9 07/19/2024 11:26 AM     Lab Results   Component Value Date/Time    PROTHROMBTM 13.3 03/01/2024 11:05 AM    INR 1.00 03/01/2024 11:05 AM     Lab Results   Component Value Date/Time    WBC 10.8 03/01/2024 11:05 AM    RBC 4.98 03/01/2024 11:05 AM    HEMOGLOBIN 14.3 03/01/2024 11:05 AM    HEMATOCRIT 42.6 03/01/2024 11:05 AM    MCV 85.5 03/01/2024 11:05 AM    MCH 28.7 03/01/2024 11:05 AM    MCHC 33.6 03/01/2024 11:05 AM    RDW 40.3 03/01/2024 11:05 AM    PLATELETCT 303 03/01/2024 11:05 AM    MPV 10.8 03/01/2024 11:05 AM     Lab Results   Component Value Date/Time    TOTPROTUR >600.0 (H) 07/19/2024 11:30 AM    CREATININEU 100.04 07/19/2024 11:30 AM     Lab Results   Component Value Date/Time    COLORURINE Yellow 07/19/2024 11:30 AM    SPECGRAVITY 1.023 07/19/2024 11:30 AM    PHURINE 6.5 07/19/2024 11:30 AM    GLUCOSEUR >=1000 (A) 07/19/2024 11:30 AM    KETONES Negative 07/19/2024 11:30 AM    PROTEINURIN >=1000 (A) 07/19/2024 11:30 AM     RADIOLOGY RESULTS REVIEWED AND  INTERPRETED BY ME: See above      All relevant laboratory and imaging results reported on this note were reviewed and interpreted by me.         Assessment & Plan     Josephine Amador is a 44 y.o. female with history as noted above whose presentation merits the following clinical impressions and recommendations:    1. IgA nephropathy  Diagnosed on renal biopsy in 3/2024 with results showing severe chronic injury consistent with a combination of diabetic nephropathy and IgA nephropathy.  Overall clinical symptoms is consistent with renal limited IgA nephropathy as she does not have systemic symptoms (no abdominal pain, arthritis, or cutaneous manifestations).  IgA nephropathy is generally self-limited but can often require immunosuppressive therapy in patients with poor prognostic factors such as those with proteinuria > 1 g/day and nephrotic syndrome.  As such, initiated immunosuppressive therapy with azathioprine in 5/2024 but she was on it for only 1 month due to cost so requests that RX be sent to a different pharmacy with better coverage. Still has persistent proteinuria but no signs of systemic involvement. Suspect most of this proteinuria is from longstanding history of uncontrolled T2DM (>24 years) rather than IgA nephropathy but will continue AZA to see if there is any improvement.   - Azathioprine 75 MG Tab; Take 1 Tablet by mouth every day.  Dispense: 30 Tablet; Refill: 5  - PROTEIN/CREAT RATIO URINE; Future  - URINALYSIS; Future    2. Serologic autoimmunity  ODELL (1: 2560, speckled pattern), anti-SM (mildly positive at 54)  Serologic evidence of immunologic activity without meeting the diagnostic criteria for any underlying ODELL-associated autoimmune disease, such as Sjogren syndrome, systemic lupus, inflammatory myopathy, or systemic sclerosis. That said, the presence of persistently positive ODELL, especially in high or rising titers, does confer some risk of ODELL-associated disease, so if truly  inflammatory symptoms develop and persist or symptoms concerning for SLE develops, clinical follow-up for re-evaluation would be reasonable.     3. Encounter for long term current use of azathioprine  Need routine monitoring per guidelines (every 3 months).  - CBC WITH DIFFERENTIAL; Future  - Comp Metabolic Panel; Future    4. Stage 3b chronic kidney disease  Will establish with a new nephrologist in 10/2024.   - Continue losartan     5. Throat congestion  Associated with fullness. Inhalers prescribed by pulmonology are ineffective. Recommended ENT evaluation which she will discuss with her primary.     The above assessment and plan were discussed with the patient who acknowledged understanding of the plan.    FOLLOW-UP: Return in about 3 months (around 11/26/2024).         Thank you for the opportunity to participate in the care of Josephine Amador.    Xiomara Felton D.O.  Rheumatologist

## 2024-08-26 NOTE — PATIENT INSTRUCTIONS
Thank you for visiting our clinic today.     Summary of your visit:      Follow up in 3 months.     [facility] RHEUMATOLOGY AFTER VISIT GUIDE  Below are important guidelines to help you navigate your health care needs and assist us in caring for you safely and  effectively. We encourage you to carefully read and understand this information and adhere to them accordingly.    [employer] Messaging and Phone Calls:   Diagnosis and Treatment - For a detailed explanation of your condition and treatment plan from today’s visit, refer  to the visit note on [employer] via the following steps:  o Log in to [employer] and click on “Visits” at the top.  o Scroll down to “Past Visits” under Appointments.  o Click on “View Notes” under the appropriate visit date.   Questions or Concerns - [employer] messaging is for non-urgent matters that do not require immediate attention and  should be brief with no more than two questions or concerns. If you have multiple questions or concerns, we ask that  you schedule an appointment to have them properly addressed.   Response to Messages - [employer] messages are addressed throughout the week depending on clinical availability,  so we ask that you allow up to one week for a response.   Phone Calls and Voicemails - Phone calls and voicemail messages are reserved for time-sensitive matters that  cannot wait to be addressed via [employer]. We ask that you refrain from calling the office multiple times or leaving  multiple voicemails regarding the same issue as doing so may lead to delays in response time.   Urgent Issues - For urgent medical matters or medical emergencies that cannot wait, you are advised to go to your  nearest Urgent Care or Emergency Department for immediate attention.    Laboratory Tests and Imaging Studies:   Future Lab and Imaging Orders - We ask that you get your lab tests and imaging studies done no later than one  week before your follow-up visit unless instructed otherwise.   Results  Communication - You may see some test results marked as “abnormal” that are not necessarily significant  or concerning. If there are significant abnormalities on your test results that warrant further action, you will be notified  via MyChart or phone call, otherwise they will be addressed at your follow-up visit.    Prescriptions and Refill Requests:   General Prescriptions (e.g. prednisone, hydroxychloroquine, leflunomide, methotrexate, etc.) - These are sent  to Retail Pharmacies, so all refill requests of these medications should be directed to your local pharmacy.   Specialty Prescriptions (e.g. Enbrel, Humira, Cosentyx, Xeljanz, etc.) - These are sent to Specialty Pharmacies,  so all refill requests of these medications should be directed to your designated specialty pharmacy.   Infusion Prescriptions (e.g. Remicade, Simponi Aria, Rituxan, Saphnelo, etc.) - These are sent to Outpatient  Infusion Centers, so all scheduling requests of these medications should be directed to your local infusion center.    Medication Risks and Adverse Effects:   Immunosuppressed Status - Steroids and antirheumatic drugs are immunosuppressants, so they increase the risk  of infections and can have side effects on various organ systems in your body, though most of them are uncommon.   Potential Side Effects - Be sure to read the drug package inserts to learn about the potential side effects of your  medications before you start taking them and take them exactly as prescribed unless instructed otherwise.   In Case of Side Effects - If you experience any significant side effects, stop taking the medication immediately and  promptly notify the prescriber. Depending on the severity of the side effects, consider going to an Urgent Care or  Emergency Department for immediate attention.    Immunizations and Health Screening:   Vaccinations - If you are on immunosuppressive therapy, it is important that you are up to date on  age-appropriate  immunizations, particularly shingles and pneumonia vaccines, which you can request from your primary care provider  or from us at your next appointment.   Screening Tests - It is also important that you are up to date on age-appropriate screening tests, such as pap  smear, mammography, and colonoscopy, which you can request from your primary care provider.    Educational and Supportive Resources:   [facility] ([email or web address]) - Visit our website to learn more about  your condition and other rheumatic diseases, and gain access to many helpful resources for them.   Disposal of Old Medications (www.ki.gov/everyday-takeback-day) - Visit the Drug Enforcement Administration  website to find a nearby location where you can properly dispose of old medications you no longer need.   Disposal of Used Rapelje (www.safeneedledisposal.org) - Visit the Safe Needle Disposal Organization website to  find a nearby location where you can properly dispose of used needles from your injectable medications.  Revised 6/14/2024

## 2024-11-25 ENCOUNTER — APPOINTMENT (OUTPATIENT)
Dept: RHEUMATOLOGY | Facility: MEDICAL CENTER | Age: 44
End: 2024-11-25
Attending: STUDENT IN AN ORGANIZED HEALTH CARE EDUCATION/TRAINING PROGRAM
Payer: COMMERCIAL

## 2024-12-02 ENCOUNTER — APPOINTMENT (OUTPATIENT)
Dept: RHEUMATOLOGY | Facility: MEDICAL CENTER | Age: 44
End: 2024-12-02
Attending: STUDENT IN AN ORGANIZED HEALTH CARE EDUCATION/TRAINING PROGRAM
Payer: COMMERCIAL